# Patient Record
Sex: MALE | Race: WHITE | NOT HISPANIC OR LATINO | Employment: OTHER | URBAN - METROPOLITAN AREA
[De-identification: names, ages, dates, MRNs, and addresses within clinical notes are randomized per-mention and may not be internally consistent; named-entity substitution may affect disease eponyms.]

---

## 2024-04-22 SDOH — HEALTH STABILITY: PHYSICAL HEALTH: ON AVERAGE, HOW MANY MINUTES DO YOU ENGAGE IN EXERCISE AT THIS LEVEL?: 30 MIN

## 2024-04-22 SDOH — HEALTH STABILITY: PHYSICAL HEALTH: ON AVERAGE, HOW MANY DAYS PER WEEK DO YOU ENGAGE IN MODERATE TO STRENUOUS EXERCISE (LIKE A BRISK WALK)?: 4 DAYS

## 2024-04-22 ASSESSMENT — SOCIAL DETERMINANTS OF HEALTH (SDOH): HOW OFTEN DO YOU GET TOGETHER WITH FRIENDS OR RELATIVES?: ONCE A WEEK

## 2024-04-23 ENCOUNTER — OFFICE VISIT (OUTPATIENT)
Dept: FAMILY MEDICINE | Facility: CLINIC | Age: 39
End: 2024-04-23
Payer: COMMERCIAL

## 2024-04-23 VITALS
HEART RATE: 78 BPM | BODY MASS INDEX: 27.71 KG/M2 | SYSTOLIC BLOOD PRESSURE: 131 MMHG | TEMPERATURE: 97.9 F | OXYGEN SATURATION: 99 % | WEIGHT: 172.4 LBS | HEIGHT: 66 IN | DIASTOLIC BLOOD PRESSURE: 81 MMHG | RESPIRATION RATE: 16 BRPM

## 2024-04-23 DIAGNOSIS — Z13.220 ENCOUNTER FOR LIPID SCREENING FOR CARDIOVASCULAR DISEASE: ICD-10-CM

## 2024-04-23 DIAGNOSIS — R03.0 ELEVATED BLOOD PRESSURE READING WITHOUT DIAGNOSIS OF HYPERTENSION: ICD-10-CM

## 2024-04-23 DIAGNOSIS — Z23 NEED FOR VACCINATION: ICD-10-CM

## 2024-04-23 DIAGNOSIS — Z13.6 ENCOUNTER FOR LIPID SCREENING FOR CARDIOVASCULAR DISEASE: ICD-10-CM

## 2024-04-23 DIAGNOSIS — Z00.00 WELL ADULT EXAM: Primary | ICD-10-CM

## 2024-04-23 LAB
ALBUMIN SERPL BCG-MCNC: 4.8 G/DL (ref 3.5–5.2)
ALP SERPL-CCNC: 92 U/L (ref 40–150)
ALT SERPL W P-5'-P-CCNC: 30 U/L (ref 0–70)
ANION GAP SERPL CALCULATED.3IONS-SCNC: 8 MMOL/L (ref 7–15)
AST SERPL W P-5'-P-CCNC: 29 U/L (ref 0–45)
BILIRUB SERPL-MCNC: 0.5 MG/DL
BUN SERPL-MCNC: 11.7 MG/DL (ref 6–20)
CALCIUM SERPL-MCNC: 9.8 MG/DL (ref 8.6–10)
CHLORIDE SERPL-SCNC: 105 MMOL/L (ref 98–107)
CHOLEST SERPL-MCNC: 167 MG/DL
CREAT SERPL-MCNC: 1.05 MG/DL (ref 0.67–1.17)
DEPRECATED HCO3 PLAS-SCNC: 28 MMOL/L (ref 22–29)
EGFRCR SERPLBLD CKD-EPI 2021: >90 ML/MIN/1.73M2
FASTING STATUS PATIENT QL REPORTED: NO
GLUCOSE SERPL-MCNC: 111 MG/DL (ref 70–99)
HBA1C MFR BLD: 5.1 % (ref 0–5.6)
HCV AB SERPL QL IA: NONREACTIVE
HDLC SERPL-MCNC: 40 MG/DL
HIV 1+2 AB+HIV1 P24 AG SERPL QL IA: NONREACTIVE
LDLC SERPL CALC-MCNC: 103 MG/DL
NONHDLC SERPL-MCNC: 127 MG/DL
POTASSIUM SERPL-SCNC: 4.8 MMOL/L (ref 3.4–5.3)
PROT SERPL-MCNC: 7.5 G/DL (ref 6.4–8.3)
SODIUM SERPL-SCNC: 141 MMOL/L (ref 135–145)
TRIGL SERPL-MCNC: 121 MG/DL

## 2024-04-23 PROCEDURE — 99385 PREV VISIT NEW AGE 18-39: CPT | Mod: 25 | Performed by: FAMILY MEDICINE

## 2024-04-23 PROCEDURE — 86803 HEPATITIS C AB TEST: CPT | Performed by: FAMILY MEDICINE

## 2024-04-23 PROCEDURE — 90715 TDAP VACCINE 7 YRS/> IM: CPT | Performed by: FAMILY MEDICINE

## 2024-04-23 PROCEDURE — 87389 HIV-1 AG W/HIV-1&-2 AB AG IA: CPT | Performed by: FAMILY MEDICINE

## 2024-04-23 PROCEDURE — 83036 HEMOGLOBIN GLYCOSYLATED A1C: CPT | Performed by: FAMILY MEDICINE

## 2024-04-23 PROCEDURE — 80061 LIPID PANEL: CPT | Performed by: FAMILY MEDICINE

## 2024-04-23 PROCEDURE — 80053 COMPREHEN METABOLIC PANEL: CPT | Performed by: FAMILY MEDICINE

## 2024-04-23 PROCEDURE — 90471 IMMUNIZATION ADMIN: CPT | Performed by: FAMILY MEDICINE

## 2024-04-23 PROCEDURE — 36415 COLL VENOUS BLD VENIPUNCTURE: CPT | Performed by: FAMILY MEDICINE

## 2024-04-23 NOTE — PATIENT INSTRUCTIONS
Healthy Lifestyle   Nutrition and healthy eating: The Mediterranean Diet  Ready to switch to a more heart-healthy diet? Here's how to get started with the Mediterranean diet.  By [facility] Staff   If you're looking for a heart-healthy eating plan, the Mediterranean diet might be right for you.  The Mediterranean diet blends the basics of healthy eating with the traditional flavors and cooking methods of the [place].  Interest in the Mediterranean diet began in the 1960s with the observation that coronary heart disease caused fewer deaths in Mediterranean countries, such as [place] and [place], than in the [place] and northern [place]. Subsequent studies found that the Mediterranean diet is associated with reduced risk factors for cardiovascular disease.  The Mediterranean diet is one of the healthy eating plans recommended by the Dietary Guidelines for Americans to promote health and prevent chronic disease.  It is also recognized by the World Health Organization as a healthy and sustainable dietary pattern and as an intangible cultural asset by the United National Educational, Scientific and Cultural Organization.  The Mediterranean diet is a way of eating based on the traditional cuisine of countries bordering the [place] Sea. While there is no single definition of the Mediterranean diet, it is typically high in vegetables, fruits, whole grains, beans, nut and seeds, and olive oil.  The main components of Mediterranean diet include:  Daily consumption of vegetables, fruits, whole grains and healthy fats   Weekly intake of fish, poultry, beans and eggs   Moderate portions of dairy products   Limited intake of red meat  Other important elements of the Mediterranean diet are sharing meals with family and friends, enjoying a glass of red wine and being physically active.  The foundation of the Mediterranean diet is vegetables, fruits, herbs, nuts, beans and whole grains. Meals are built around these  "plant-based foods. Moderate amounts of dairy, poultry and eggs are also central to the Mediterranean Diet, as is seafood. In contrast, red meat is eaten only occasionally.  Healthy fats are a mainstay of the Mediterranean diet. They're eaten instead of less healthy fats, such as saturated and trans fats, which contribute to heart disease.  Olive oil is the primary source of added fat in the Mediterranean diet. Olive oil provides monounsaturated fat, which has been found to lower total cholesterol and low-density lipoprotein (LDL or \"bad\") cholesterol levels. Nuts and seeds also contain monounsaturated fat.  Fish are also important in the Mediterranean diet. Fatty fish -- such as mackerel, herring, sardines, albacore tuna, salmon and lake trout -- are rich in omega-3 fatty acids, a type of polyunsaturated fat that may reduce inflammation in the body. Omega-3 fatty acids also help decrease triglycerides, reduce blood clotting, and decrease the risk of stroke and heart failure.  The Mediterranean diet typically allows red wine in moderation. Although alcohol has been associated with a reduced risk of heart disease in some studies, it's by no means risk free. The Dietary Guidelines for Americans caution against beginning to drink or drinking more often on the basis of potential health benefits.  Interested in trying the Mediterranean diet? These tips will help you get started:  Eat more fruits and vegetables. Aim for 7 to 10 servings a day of fruit and vegetables.   Opt for whole grains. Switch to whole-grain bread, cereal and pasta. [place] with other whole grains, such as bulgur and farro.   Use healthy fats. Try olive oil as a replacement for butter when cooking. Instead of putting butter or margarine on bread, try dipping it in flavored olive oil.   Eat more seafood. Eat fish twice a week. Fresh or water-packed tuna, salmon, trout, mackerel and herring are healthy choices. Grilled fish tastes good and requires " little cleanup. Avoid deep-fried fish.   Reduce red meat. Substitute fish, poultry or beans for meat. If you eat meat, make sure it's lean and keep portions small.   Enjoy some dairy. Eat low-fat Greek or plain yogurt and small amounts of a variety of cheeses.   Spice it up. Herbs and spices boost flavor and lessen the need for salt.  The Mediterranean diet is a delicious and healthy way to eat. Many people who switch to this style of eating say they'll never eat any other way.

## 2024-04-23 NOTE — PROGRESS NOTES
"Preventive Care Visit  [facility] OWEN Mcneal MD, Family Medicine  Apr 23, 2024      Assessment & Plan       ICD-10-CM    1. Well adult exam  Z00.00 Comprehensive metabolic panel     Hemoglobin A1c     Lipid panel reflex to direct LDL Non-fasting     HIV Antigen Antibody Combo     Hepatitis C Screen Reflex to HCV RNA Quant and Genotype     Comprehensive metabolic panel     Hemoglobin A1c     Lipid panel reflex to direct LDL Non-fasting     HIV Antigen Antibody Combo     Hepatitis C Screen Reflex to HCV RNA Quant and Genotype      2. Encounter for lipid screening for cardiovascular disease  Z13.220 Lipid panel reflex to direct LDL Non-fasting    Z13.6 Lipid panel reflex to direct LDL Non-fasting      3. Need for vaccination  Z23       4. Elevated blood pressure reading without diagnosis of hypertension  R03.0       Bp log given  Update with pressures in about 2 weeks      Patient has been advised of split billing requirements and indicates understanding: Yes  Review of external notes as documented elsewhere in note        BMI  Estimated body mass index is 27.71 kg/m  as calculated from the following:    Height as of this encounter: 1.68 m (5' 6.14\").    Weight as of this encounter: 78.2 kg (172 lb 6.4 oz).   Weight management plan: Discussed healthy diet and exercise guidelines    Counseling  Appropriate preventive services were discussed with this patient, including applicable screening as appropriate for fall prevention, nutrition, physical activity, Tobacco-use cessation, weight loss and cognition.  Checklist reviewing preventive services available has been given to the patient.  Reviewed patient's diet, addressing concerns and/or questions.   The patient reports drinking more than one alcoholic drink per day and sometimes engages in binge or excessive drinking. The patient was counseled and given information about possible harmful effects of excessive alcohol intake as well as where " to get help for alcohol problems.     Patient Instructions     Healthy Lifestyle   Nutrition and healthy eating: The Mediterranean Diet  Ready to switch to a more heart-healthy diet? Here's how to get started with the Mediterranean diet.  By [facility] Staff   If you're looking for a heart-healthy eating plan, the Mediterranean diet might be right for you.  The Mediterranean diet blends the basics of healthy eating with the traditional flavors and cooking methods of the Mediterranean.  Interest in the Mediterranean diet began in the 1960s with the observation that coronary heart disease caused fewer deaths in Mediterranean countries, such as [place] and [place], than in the [place] and northern [place]. Subsequent studies found that the Mediterranean diet is associated with reduced risk factors for cardiovascular disease.  The Mediterranean diet is one of the healthy eating plans recommended by the Dietary Guidelines for Americans to promote health and prevent chronic disease.  It is also recognized by the World Health Organization as a healthy and sustainable dietary pattern and as an intangible cultural asset by the United National Educational, Scientific and Cultural Organization.  The Mediterranean diet is a way of eating based on the traditional cuisine of countries bordering the [place] Sea. While there is no single definition of the Mediterranean diet, it is typically high in vegetables, fruits, whole grains, beans, nut and seeds, and olive oil.  The main components of Mediterranean diet include:  Daily consumption of vegetables, fruits, whole grains and healthy fats   Weekly intake of fish, poultry, beans and eggs   Moderate portions of dairy products   Limited intake of red meat  Other important elements of the Mediterranean diet are sharing meals with family and friends, enjoying a glass of red wine and being physically active.  The foundation of the Mediterranean diet is vegetables, fruits, herbs, nuts,  "beans and whole grains. Meals are built around these plant-based foods. Moderate amounts of dairy, poultry and eggs are also central to the Mediterranean Diet, as is seafood. In contrast, red meat is eaten only occasionally.  Healthy fats are a mainstay of the Mediterranean diet. They're eaten instead of less healthy fats, such as saturated and trans fats, which contribute to heart disease.  Olive oil is the primary source of added fat in the Mediterranean diet. Olive oil provides monounsaturated fat, which has been found to lower total cholesterol and low-density lipoprotein (LDL or \"bad\") cholesterol levels. Nuts and seeds also contain monounsaturated fat.  Fish are also important in the Mediterranean diet. Fatty fish -- such as mackerel, herring, sardines, albacore tuna, salmon and lake trout -- are rich in omega-3 fatty acids, a type of polyunsaturated fat that may reduce inflammation in the body. Omega-3 fatty acids also help decrease triglycerides, reduce blood clotting, and decrease the risk of stroke and heart failure.  The Mediterranean diet typically allows red wine in moderation. Although alcohol has been associated with a reduced risk of heart disease in some studies, it's by no means risk free. The Dietary Guidelines for Americans caution against beginning to drink or drinking more often on the basis of potential health benefits.  Interested in trying the Mediterranean diet? These tips will help you get started:  Eat more fruits and vegetables. Aim for 7 to 10 servings a day of fruit and vegetables.   Opt for whole grains. Switch to whole-grain bread, cereal and pasta. [place] with other whole grains, such as bulgur and farro.   Use healthy fats. Try olive oil as a replacement for butter when cooking. Instead of putting butter or margarine on bread, try dipping it in flavored olive oil.   Eat more seafood. Eat fish twice a week. Fresh or water-packed tuna, salmon, trout, mackerel and herring are " healthy choices. Grilled fish tastes good and requires little cleanup. Avoid deep-fried fish.   Reduce red meat. Substitute fish, poultry or beans for meat. If you eat meat, make sure it's lean and keep portions small.   Enjoy some dairy. Eat low-fat Greek or plain yogurt and small amounts of a variety of cheeses.   Spice it up. Herbs and spices boost flavor and lessen the need for salt.  The Mediterranean diet is a delicious and healthy way to eat. Many people who switch to this style of eating say they'll never eat any other way.      Tiana Che is a 38 year old, presenting for the following:  Physical        4/23/2024     7:40 AM   Additional Questions   Roomed by Noreen   Accompanied by none         4/23/2024     7:40 AM   Patient Reported Additional Medications   Patient reports taking the following new medications none        Health Care Directive  Patient does not have a Health Care Directive or Living Will: Discussed advance care planning with patient; however, patient declined at this time.    History of Present Illness       Reason for visit:  Establish annual physical visits    He eats 4 or more servings of fruits and vegetables daily.He consumes 0 sweetened beverage(s) daily.He exercises with enough effort to increase his heart rate 30 to 60 minutes per day.  He exercises with enough effort to increase his heart rate 3 or less days per week.   He is taking medications regularly.    Elevated pressures at home  140's/80's-90's  Recheck is normal here              4/22/2024   General Health   How would you rate your overall physical health? Good   Feel stress (tense, anxious, or unable to sleep) Rather much   (!) STRESS CONCERN      4/22/2024   Nutrition   Three or more servings of calcium each day? Yes   Diet: Low salt   How many servings of fruit and vegetables per day? 4 or more   How many sweetened beverages each day? 0-1         4/22/2024   Exercise   Days per week of moderate/strenous exercise  4 days   Average minutes spent exercising at this level 30 min         4/22/2024   Social Factors   Frequency of gathering with friends or relatives Once a week   Worry food won't last until get money to buy more No   Food not last or not have enough money for food? No   Do you have housing?  Yes   Are you worried about losing your housing? No   Lack of transportation? No   Unable to get utilities (heat,electricity)? No         4/22/2024   Dental   Dentist two times every year? Yes         4/22/2024   TB Screening   Were you born outside of the [place]? No         Today's PHQ-2 Score:       4/22/2024     6:40 PM   PHQ-2 ( 1999 [employer])   Q1: Little interest or pleasure in doing things 0   Q2: Feeling down, depressed or hopeless 1   PHQ-2 Score 1   Q1: Little interest or pleasure in doing things Not at all   Q2: Feeling down, depressed or hopeless Several days   PHQ-2 Score 1           4/22/2024   Substance Use   Alcohol more than 3/day or more than 7/wk Yes   How often do you have a drink containing alcohol 4 or more times a week   How many alcohol drinks on typical day 1 or 2   How often do you have 5+ drinks at one occasion Weekly   Audit 2/3 Score 3   How often not able to stop drinking once started Never   How often failed to do what normally expected Monthly   How often needed first drink in am after a heavy drinking session Never   How often feeling of guilt or remorse after drinking Weekly   How often unable to remember what happened the night before Weekly   Have you or someone else been injured because of your drinking No   Has anyone been concerned or suggested you cut down on drinking Yes, during the last year   TOTAL SCORE - AUDIT 19   Do you use any other substances recreationally? (!) ALCOHOL     Social History     Tobacco Use    Smoking status: Never    Smokeless tobacco: Never   Substance Use Topics    Alcohol use: Yes     Comment: 1 or 2    Drug use: No             4/22/2024   One time HIV Screening  "  Previous HIV test? I don't know         4/22/2024   STI Screening   New sexual partner(s) since last STI/HIV test? (!) YES          4/22/2024   Contraception/Family Planning   Questions about contraception or family planning No        Reviewed and updated as needed this visit by Provider                    BP Readings from Last 3 Encounters:   04/23/24 131/81   10/01/08 110/76    Wt Readings from Last 3 Encounters:   04/23/24 78.2 kg (172 lb 6.4 oz)   10/01/08 60.3 kg (133 lb)                      Review of Systems  Constitutional, HEENT, cardiovascular, pulmonary, gi and gu systems are negative, except as otherwise noted.     Objective    Exam  BP (!) 148/95   Pulse 78   Temp 97.9  F (36.6  C) (Temporal)   Resp 16   Ht 1.68 m (5' 6.14\")   Wt 78.2 kg (172 lb 6.4 oz)   SpO2 99%   BMI 27.71 kg/m     Estimated body mass index is 27.71 kg/m  as calculated from the following:    Height as of this encounter: 1.68 m (5' 6.14\").    Weight as of this encounter: 78.2 kg (172 lb 6.4 oz).    Physical Exam  Vitals reviewed.   Constitutional:       Appearance: Normal appearance. He is not ill-appearing.   HENT:      Head: Normocephalic.      Right Ear: Tympanic membrane and ear canal normal.      Left Ear: Tympanic membrane and ear canal normal.      Nose: Nose normal.   Eyes:      Extraocular Movements: Extraocular movements intact.   Cardiovascular:      Rate and Rhythm: Normal rate and regular rhythm.      Heart sounds: Normal heart sounds. No murmur heard.  Pulmonary:      Effort: Pulmonary effort is normal. No respiratory distress.      Breath sounds: Normal breath sounds. No wheezing or rales.   Abdominal:      Palpations: Abdomen is soft.   Musculoskeletal:         General: Normal range of motion.      Cervical back: Normal range of motion and neck supple.   Skin:     General: Skin is warm and dry.      Findings: No lesion.   Neurological:      Mental Status: He is alert and oriented to person, place, and time. "   Psychiatric:         Mood and Affect: Mood normal.         Behavior: Behavior normal.         Thought Content: Thought content normal.         Judgment: Judgment normal.               Signed Electronically by: Fan Mcneal MD

## 2024-08-13 ENCOUNTER — OFFICE VISIT (OUTPATIENT)
Dept: FAMILY MEDICINE | Facility: CLINIC | Age: 39
End: 2024-08-13
Payer: COMMERCIAL

## 2024-08-13 ENCOUNTER — E-VISIT (OUTPATIENT)
Dept: URGENT CARE | Facility: CLINIC | Age: 39
End: 2024-08-13
Payer: COMMERCIAL

## 2024-08-13 VITALS
OXYGEN SATURATION: 100 % | RESPIRATION RATE: 16 BRPM | WEIGHT: 175.8 LBS | SYSTOLIC BLOOD PRESSURE: 154 MMHG | TEMPERATURE: 98.2 F | HEART RATE: 69 BPM | DIASTOLIC BLOOD PRESSURE: 104 MMHG | BODY MASS INDEX: 28.25 KG/M2 | HEIGHT: 66 IN

## 2024-08-13 DIAGNOSIS — N48.89 PENILE PAIN: Primary | ICD-10-CM

## 2024-08-13 DIAGNOSIS — R03.0 ELEVATED BP WITHOUT DIAGNOSIS OF HYPERTENSION: ICD-10-CM

## 2024-08-13 DIAGNOSIS — R30.0 DYSURIA: Primary | ICD-10-CM

## 2024-08-13 DIAGNOSIS — R30.0 DYSURIA: ICD-10-CM

## 2024-08-13 LAB
ALBUMIN UR-MCNC: NEGATIVE MG/DL
APPEARANCE UR: CLEAR
BACTERIA #/AREA URNS HPF: ABNORMAL /HPF
BILIRUB UR QL STRIP: NEGATIVE
COLOR UR AUTO: YELLOW
GLUCOSE UR STRIP-MCNC: NEGATIVE MG/DL
HGB UR QL STRIP: NEGATIVE
KETONES UR STRIP-MCNC: NEGATIVE MG/DL
LEUKOCYTE ESTERASE UR QL STRIP: ABNORMAL
NITRATE UR QL: NEGATIVE
PH UR STRIP: 7.5 [PH] (ref 5–7)
RBC #/AREA URNS AUTO: ABNORMAL /HPF
SP GR UR STRIP: 1.01 (ref 1–1.03)
SQUAMOUS #/AREA URNS AUTO: ABNORMAL /LPF
UROBILINOGEN UR STRIP-ACNC: 0.2 E.U./DL
WBC #/AREA URNS AUTO: ABNORMAL /HPF

## 2024-08-13 PROCEDURE — 81001 URINALYSIS AUTO W/SCOPE: CPT | Performed by: FAMILY MEDICINE

## 2024-08-13 PROCEDURE — 99213 OFFICE O/P EST LOW 20 MIN: CPT | Performed by: FAMILY MEDICINE

## 2024-08-13 PROCEDURE — 87086 URINE CULTURE/COLONY COUNT: CPT | Performed by: FAMILY MEDICINE

## 2024-08-13 PROCEDURE — 99207 PR NON-BILLABLE SERV PER CHARTING: CPT | Performed by: EMERGENCY MEDICINE

## 2024-08-13 RX ORDER — SULFAMETHOXAZOLE/TRIMETHOPRIM 800-160 MG
1 TABLET ORAL 2 TIMES DAILY
Qty: 20 TABLET | Refills: 0 | Status: SHIPPED | OUTPATIENT
Start: 2024-08-13 | End: 2024-08-23

## 2024-08-13 ASSESSMENT — PAIN SCALES - GENERAL: PAINLEVEL: NO PAIN (0)

## 2024-08-13 NOTE — PATIENT INSTRUCTIONS
Dear Chinedu Guerin,    We are sorry you are not feeling well. Based on the responses you provided, it is recommended that you be seen in-person in urgent care so we can better evaluate your symptoms. Please click here to find the nearest urgent care location to you.   You will not be charged for this Visit. Thank you for trusting us with your care.    Arnold Newsome MD

## 2024-08-13 NOTE — PATIENT INSTRUCTIONS
Lifestyle Modifications to Manage Hypertension    Weight reduction to a body mass index of 18.5 to 24.9 will give rise to a systolic blood pressure reduction of  5 to 20 mm Hg.  Your current BMI is bowel movement.    Incorporating the DASH Diet (a diet rich in fruits, vegetables, and low-fat dairy products with a reduced content of saturated and total fat) is as effective as a single drug agent.  This will provide a reduction of  8 to 14 mm Hg    Dietary sodium restriction to no more than 100 mmol per day (2.4 g sodium or 6 g sodium chloride) will provide a reduction of  2 to 8 mm Hg.    Engage in regular aerobic activity such as brisk walking (at least 30 minutes per day, most days of the week). This will provide a reduction of  4 to 9 mm Hg.    Limit alcohol consumption to no more than two drinks (1 oz or 30 mL of alcohol; e.g., 24-oz beer, 10 oz of wine, or 3 oz of 80-proof whiskey) per day in most men and to no more than one drink per day in women and lighter-weight persons. This will provide a reduction of  2 to 4 mm Hg.    If continues to be elevated, we should consider using a low dose of a medication to improve this and lower your risk for heart attack and stroke.

## 2024-08-13 NOTE — PROGRESS NOTES
"  Assessment & Plan     Dysuria  Mild  Await UC  Drink Lots of fluids  - UA Macroscopic with reflex to Microscopic and Culture - Clinic Collect  - UA Microscopic with Reflex to Culture  - Urine Culture Aerobic Bacterial - lab collect; Future  - sulfamethoxazole-trimethoprim (BACTRIM DS) 800-160 MG tablet; Take 1 tablet by mouth 2 times daily for 10 days    Discussed elevated BP  Advised DASH diet  Limit alcohol to no more than 2 drinks a day and 7 a week  Follow up PCP check BP     Subjective   Chinedu is a 38 year old, presenting for the following health issues:  Urinary Problem      8/13/2024     4:30 PM   Additional Questions   Roomed by Sofiya     History of Present Illness       Reason for visit:  I have been having burning and inflammation on the tip of mypenis  After urinating the burning and inflammation flare up slightly  Anytime the tip hits my underwear cloth it also hurts  Symptom onset:  1-3 days ago  Symptoms include:  Burning and inflammation on the tip of the penis  Symptom intensity:  Mild  Symptom progression:  Worsening  Had these symptoms before:  No  What makes it worse:  Urinating  What makes it better:  No    He eats 2-3 servings of fruits and vegetables daily.He consumes 1 sweetened beverage(s) daily.He exercises with enough effort to increase his heart rate 30 to 60 minutes per day.  He exercises with enough effort to increase his heart rate 3 or less days per week.   He is taking medications regularly.     Started Friday last week  Has dysuria  No frequency  Has had Urgency  No back pain, fever or chills  Had alcohol last Night  Usually 2  alcoholic drinks a Night  Had 1/2 a Bottle of wine and whiskey yesterday  Pt does Exercise daily   Does not want to be checked for STD    No fever or back pain      Rest of the ROS is Negative except see above and Problem list [stable]      Objective    BP (!) 154/101   Pulse 69   Temp 98.2  F (36.8  C) (Temporal)   Resp 16   Ht 1.68 m (5' 6.14\")   Wt " 79.7 kg (175 lb 12.8 oz)   SpO2 100%   BMI 28.25 kg/m    Body mass index is 28.25 kg/m .  Physical Exam   GENERAL: alert and no distress  EYES: Eyes grossly normal to inspection  ABDOMEN: soft, nontender, no hepatosplenomegaly, no masses and bowel sounds normal   (male): no hernias and penis normal without urethral discharge  MS: no gross musculoskeletal defects noted, no edema    Results for orders placed or performed in visit on 08/13/24   UA Macroscopic with reflex to Microscopic and Culture - Clinic Collect     Status: Abnormal    Specimen: Urine, Midstream   Result Value Ref Range    Color Urine Yellow Colorless, Straw, Light Yellow, Yellow    Appearance Urine Clear Clear    Glucose Urine Negative Negative mg/dL    Bilirubin Urine Negative Negative    Ketones Urine Negative Negative mg/dL    Specific Gravity Urine 1.010 1.003 - 1.035    Blood Urine Negative Negative    pH Urine 7.5 (H) 5.0 - 7.0    Protein Albumin Urine Negative Negative mg/dL    Urobilinogen Urine 0.2 0.2, 1.0 E.U./dL    Nitrite Urine Negative Negative    Leukocyte Esterase Urine Small (A) Negative   UA Microscopic with Reflex to Culture     Status: Abnormal   Result Value Ref Range    Bacteria Urine Few (A) None Seen /HPF    RBC Urine 0-2 0-2 /HPF /HPF    WBC Urine 0-5 0-5 /HPF /HPF    Squamous Epithelials Urine None Seen None Seen /LPF    Narrative    Urine Culture not indicated           Signed Electronically by: Ivy Man MD

## 2024-08-15 LAB — BACTERIA UR CULT: NO GROWTH

## 2024-08-18 ENCOUNTER — MYC MEDICAL ADVICE (OUTPATIENT)
Dept: FAMILY MEDICINE | Facility: CLINIC | Age: 39
End: 2024-08-18
Payer: COMMERCIAL

## 2024-08-19 NOTE — TELEPHONE ENCOUNTER
Dr. Man,     Please see mychart and advise.     Thanks,  LYNN Moreira  [facility]

## 2024-08-19 NOTE — TELEPHONE ENCOUNTER
[employer] message sent to patient.     Thanks,  LYNN Moreira  [facility]

## 2024-09-13 ENCOUNTER — OFFICE VISIT (OUTPATIENT)
Dept: FAMILY MEDICINE | Facility: CLINIC | Age: 39
End: 2024-09-13
Payer: COMMERCIAL

## 2024-09-13 VITALS
WEIGHT: 170 LBS | DIASTOLIC BLOOD PRESSURE: 89 MMHG | OXYGEN SATURATION: 100 % | SYSTOLIC BLOOD PRESSURE: 139 MMHG | HEIGHT: 66 IN | BODY MASS INDEX: 27.32 KG/M2 | HEART RATE: 72 BPM | RESPIRATION RATE: 16 BRPM | TEMPERATURE: 97.6 F

## 2024-09-13 DIAGNOSIS — I10 BENIGN ESSENTIAL HYPERTENSION: Primary | ICD-10-CM

## 2024-09-13 DIAGNOSIS — N48.1 BALANITIS: ICD-10-CM

## 2024-09-13 PROCEDURE — G2211 COMPLEX E/M VISIT ADD ON: HCPCS | Performed by: FAMILY MEDICINE

## 2024-09-13 PROCEDURE — 99215 OFFICE O/P EST HI 40 MIN: CPT | Performed by: FAMILY MEDICINE

## 2024-09-13 RX ORDER — LISINOPRIL 5 MG/1
5 TABLET ORAL DAILY
Qty: 30 TABLET | Refills: 2 | Status: SHIPPED | OUTPATIENT
Start: 2024-09-13

## 2024-09-13 RX ORDER — FLUCONAZOLE 150 MG/1
150 TABLET ORAL ONCE
Qty: 1 TABLET | Refills: 0 | Status: SHIPPED | OUTPATIENT
Start: 2024-09-13 | End: 2024-09-13

## 2024-09-13 NOTE — PROGRESS NOTES
Assessment & Plan       ICD-10-CM    1. Benign essential hypertension  I10 lisinopril (ZESTRIL) 5 MG tablet      2. Balanitis  N48.1 fluconazole (DIFLUCAN) 150 MG tablet          Start trial of lisinopril, continue to monitor home pressures  Will give antifungal, continue aquaphor, trial of zinc oxide, monitor ulceration which may need additional testing if persists.        The longitudinal plan of care for the diagnosis(es)/condition(s) as documented were addressed during this visit. Due to the added complexity in care, I will continue to support Chinedu in the subsequent management and with ongoing continuity of care.     A total of 40 minutes spent face-to-face with greater than 50% of the time spent in counseling and coordinating cares of the issues above   There are no Patient Instructions on file for this visit.    Subjective   Chinedu is a 38 year old, presenting for the following health issues:  Hypertension        9/13/2024     7:38 AM   Additional Questions   Roomed by Noreen   Accompanied by none         9/13/2024     7:38 AM   Patient Reported Additional Medications   Patient reports taking the following new medications none     History of Present Illness       Hypertension: He presents for follow up of hypertension.  He does not check blood pressure  regularly outside of the clinic. Outside blood pressures have been over 140/90. He follows a low salt diet.     Reason for visit:  Getting a check up on high blood pressure and a check up on penis irritation    He eats 4 or more servings of fruits and vegetables daily.He consumes 0 sweetened beverage(s) daily.He exercises with enough effort to increase his heart rate 30 to 60 minutes per day.  He exercises with enough effort to increase his heart rate 5 days per week.   He is taking medications regularly.     Penile/urethral irritation  Started after long run, presumed 2/2 friction in underwear.  Has happened before, usually resolves quickly, however current  "symptoms have persisted for about 1 month.  Burning after urination, pain with contact.  Abx didn't help  Hydrocortisone and aquaphor have helped a little  Changing underwear material  Did use a band-aid when running.  Possible that irritation from band-aid adhesive just proximal to glans caused acute ulceration.  Had not noticed ulceration previously until exam today              Review of Systems  Constitutional, HEENT, cardiovascular, pulmonary, gi and gu systems are negative, except as otherwise noted.      Objective    /89   Pulse 72   Temp 97.6  F (36.4  C) (Temporal)   Resp 16   Ht 1.68 m (5' 6.14\")   Wt 77.1 kg (170 lb)   SpO2 100%   BMI 27.32 kg/m    Body mass index is 27.32 kg/m .  Physical Exam  Constitutional:       General: He is not in acute distress.     Appearance: Normal appearance. He is well-developed. He is not ill-appearing.   HENT:      Head: Normocephalic and atraumatic.      Right Ear: External ear normal.      Left Ear: External ear normal.      Nose: Nose normal.   Eyes:      General: No scleral icterus.     Extraocular Movements: Extraocular movements intact.      Conjunctiva/sclera: Conjunctivae normal.   Cardiovascular:      Rate and Rhythm: Normal rate.   Pulmonary:      Effort: Pulmonary effort is normal.   Genitourinary:     Comments: Erythema at inferior aspect of urethra and around distal shaft.  Ulceration (0.5cm) just proximal to glans @ 2 O'clock position, slightly tender  Musculoskeletal:      Cervical back: Normal range of motion and neck supple.   Skin:     General: Skin is warm and dry.   Neurological:      Mental Status: He is alert and oriented to person, place, and time.   Psychiatric:         Behavior: Behavior normal.         Thought Content: Thought content normal.         Judgment: Judgment normal.                    Signed Electronically by: Fan Mcneal MD    "

## 2024-09-22 ENCOUNTER — MYC MEDICAL ADVICE (OUTPATIENT)
Dept: FAMILY MEDICINE | Facility: CLINIC | Age: 39
End: 2024-09-22
Payer: COMMERCIAL

## 2024-09-23 NOTE — TELEPHONE ENCOUNTER
Routing [employer] message to provider.    Patient stating BP is 117/77 since starting Lisinopril.    Patient asking if should avoid high potassium foods since starting lisinopril.    Please advise.    Christine M Klisch, RN

## 2024-10-16 ENCOUNTER — MYC REFILL (OUTPATIENT)
Dept: FAMILY MEDICINE | Facility: CLINIC | Age: 39
End: 2024-10-16
Payer: COMMERCIAL

## 2024-10-16 DIAGNOSIS — I10 BENIGN ESSENTIAL HYPERTENSION: ICD-10-CM

## 2024-10-16 RX ORDER — LISINOPRIL 5 MG/1
5 TABLET ORAL DAILY
Qty: 30 TABLET | Refills: 2 | OUTPATIENT
Start: 2024-10-16

## 2024-10-24 ENCOUNTER — TRANSFERRED RECORDS (OUTPATIENT)
Dept: HEALTH INFORMATION MANAGEMENT | Facility: CLINIC | Age: 39
End: 2024-10-24
Payer: COMMERCIAL

## 2024-12-03 ENCOUNTER — MYC REFILL (OUTPATIENT)
Dept: FAMILY MEDICINE | Facility: CLINIC | Age: 39
End: 2024-12-03
Payer: COMMERCIAL

## 2024-12-03 DIAGNOSIS — I10 BENIGN ESSENTIAL HYPERTENSION: ICD-10-CM

## 2024-12-04 RX ORDER — LISINOPRIL 5 MG/1
5 TABLET ORAL DAILY
Qty: 90 TABLET | Refills: 2 | Status: SHIPPED | OUTPATIENT
Start: 2024-12-04

## 2025-03-11 ENCOUNTER — MYC REFILL (OUTPATIENT)
Dept: FAMILY MEDICINE | Facility: CLINIC | Age: 40
End: 2025-03-11
Payer: COMMERCIAL

## 2025-03-11 DIAGNOSIS — I10 BENIGN ESSENTIAL HYPERTENSION: ICD-10-CM

## 2025-03-12 RX ORDER — LISINOPRIL 5 MG/1
5 TABLET ORAL DAILY
Qty: 90 TABLET | Refills: 2 | OUTPATIENT
Start: 2025-03-12

## 2025-03-24 ENCOUNTER — PATIENT OUTREACH (OUTPATIENT)
Dept: CARE COORDINATION | Facility: CLINIC | Age: 40
End: 2025-03-24
Payer: COMMERCIAL

## 2025-04-07 ENCOUNTER — PATIENT OUTREACH (OUTPATIENT)
Dept: CARE COORDINATION | Facility: CLINIC | Age: 40
End: 2025-04-07
Payer: COMMERCIAL

## 2025-06-01 ENCOUNTER — HEALTH MAINTENANCE LETTER (OUTPATIENT)
Age: 40
End: 2025-06-01